# Patient Record
Sex: MALE | Race: WHITE | NOT HISPANIC OR LATINO | Employment: FULL TIME | URBAN - METROPOLITAN AREA
[De-identification: names, ages, dates, MRNs, and addresses within clinical notes are randomized per-mention and may not be internally consistent; named-entity substitution may affect disease eponyms.]

---

## 2017-01-18 ENCOUNTER — TRANSCRIBE ORDERS (OUTPATIENT)
Dept: SLEEP CENTER | Facility: CLINIC | Age: 47
End: 2017-01-18

## 2017-01-18 DIAGNOSIS — G47.33 OBSTRUCTIVE SLEEP APNEA (ADULT) (PEDIATRIC): Primary | ICD-10-CM

## 2017-02-17 ENCOUNTER — TRANSCRIBE ORDERS (OUTPATIENT)
Dept: SLEEP CENTER | Facility: CLINIC | Age: 47
End: 2017-02-17

## 2017-05-03 ENCOUNTER — HOSPITAL ENCOUNTER (OUTPATIENT)
Dept: SLEEP CENTER | Facility: CLINIC | Age: 47
Discharge: HOME | End: 2017-05-03
Payer: COMMERCIAL

## 2017-05-03 ENCOUNTER — TRANSCRIBE ORDERS (OUTPATIENT)
Dept: SLEEP CENTER | Facility: CLINIC | Age: 47
End: 2017-05-03

## 2017-05-03 DIAGNOSIS — G47.33 OBSTRUCTIVE SLEEP APNEA (ADULT) (PEDIATRIC): ICD-10-CM

## 2017-05-03 DIAGNOSIS — G47.33 OSA (OBSTRUCTIVE SLEEP APNEA): Primary | ICD-10-CM

## 2017-07-13 ENCOUNTER — LAB REQUISITION (OUTPATIENT)
Dept: LAB | Facility: HOSPITAL | Age: 47
End: 2017-07-13
Payer: COMMERCIAL

## 2017-07-13 DIAGNOSIS — E78.5 HYPERLIPIDEMIA: ICD-10-CM

## 2017-07-13 LAB
ALBUMIN SERPL BCP-MCNC: 4.2 G/DL (ref 3.5–5)
ALP SERPL-CCNC: 51 U/L (ref 46–116)
ALT SERPL W P-5'-P-CCNC: 40 U/L (ref 12–78)
ANION GAP SERPL CALCULATED.3IONS-SCNC: 6 MMOL/L (ref 4–13)
AST SERPL W P-5'-P-CCNC: 27 U/L (ref 5–45)
BASOPHILS # BLD AUTO: 0.03 THOUSANDS/ΜL (ref 0–0.1)
BASOPHILS NFR BLD AUTO: 1 % (ref 0–1)
BILIRUB SERPL-MCNC: 1.03 MG/DL (ref 0.2–1)
BUN SERPL-MCNC: 11 MG/DL (ref 5–25)
CALCIUM SERPL-MCNC: 9 MG/DL (ref 8.3–10.1)
CHLORIDE SERPL-SCNC: 105 MMOL/L (ref 100–108)
CHOLEST SERPL-MCNC: 169 MG/DL (ref 50–200)
CO2 SERPL-SCNC: 27 MMOL/L (ref 21–32)
CREAT SERPL-MCNC: 0.93 MG/DL (ref 0.6–1.3)
EOSINOPHIL # BLD AUTO: 0.09 THOUSAND/ΜL (ref 0–0.61)
EOSINOPHIL NFR BLD AUTO: 2 % (ref 0–6)
ERYTHROCYTE [DISTWIDTH] IN BLOOD BY AUTOMATED COUNT: 13.9 % (ref 11.6–15.1)
GFR SERPL CREATININE-BSD FRML MDRD: >60 ML/MIN/1.73SQ M
GLUCOSE P FAST SERPL-MCNC: 85 MG/DL (ref 65–99)
HCT VFR BLD AUTO: 42.9 % (ref 36.5–49.3)
HDLC SERPL-MCNC: 38 MG/DL (ref 40–60)
HGB BLD-MCNC: 15.1 G/DL (ref 12–17)
LDLC SERPL CALC-MCNC: 91 MG/DL (ref 0–100)
LYMPHOCYTES # BLD AUTO: 1.54 THOUSANDS/ΜL (ref 0.6–4.47)
LYMPHOCYTES NFR BLD AUTO: 30 % (ref 14–44)
MCH RBC QN AUTO: 30.9 PG (ref 26.8–34.3)
MCHC RBC AUTO-ENTMCNC: 35.2 G/DL (ref 31.4–37.4)
MCV RBC AUTO: 88 FL (ref 82–98)
MONOCYTES # BLD AUTO: 0.42 THOUSAND/ΜL (ref 0.17–1.22)
MONOCYTES NFR BLD AUTO: 8 % (ref 4–12)
NEUTROPHILS # BLD AUTO: 3.02 THOUSANDS/ΜL (ref 1.85–7.62)
NEUTS SEG NFR BLD AUTO: 59 % (ref 43–75)
NRBC BLD AUTO-RTO: 0 /100 WBCS
PLATELET # BLD AUTO: 170 THOUSANDS/UL (ref 149–390)
PMV BLD AUTO: 11.3 FL (ref 8.9–12.7)
POTASSIUM SERPL-SCNC: 4.5 MMOL/L (ref 3.5–5.3)
PROT SERPL-MCNC: 7.6 G/DL (ref 6.4–8.2)
RBC # BLD AUTO: 4.88 MILLION/UL (ref 3.88–5.62)
SODIUM SERPL-SCNC: 138 MMOL/L (ref 136–145)
TRIGL SERPL-MCNC: 200 MG/DL
WBC # BLD AUTO: 5.12 THOUSAND/UL (ref 4.31–10.16)

## 2017-07-13 PROCEDURE — 85025 COMPLETE CBC W/AUTO DIFF WBC: CPT | Performed by: FAMILY MEDICINE

## 2017-07-13 PROCEDURE — 80053 COMPREHEN METABOLIC PANEL: CPT | Performed by: FAMILY MEDICINE

## 2017-07-13 PROCEDURE — 80061 LIPID PANEL: CPT | Performed by: FAMILY MEDICINE

## 2017-07-24 ENCOUNTER — ALLSCRIPTS OFFICE VISIT (OUTPATIENT)
Dept: OTHER | Facility: OTHER | Age: 47
End: 2017-07-24

## 2017-09-20 ENCOUNTER — TRANSCRIBE ORDERS (OUTPATIENT)
Dept: SLEEP CENTER | Facility: CLINIC | Age: 47
End: 2017-09-20

## 2017-09-20 ENCOUNTER — HOSPITAL ENCOUNTER (OUTPATIENT)
Dept: SLEEP CENTER | Facility: CLINIC | Age: 47
Discharge: HOME | End: 2017-09-20
Payer: COMMERCIAL

## 2017-09-20 DIAGNOSIS — G47.33 OSA (OBSTRUCTIVE SLEEP APNEA): Primary | ICD-10-CM

## 2017-09-20 DIAGNOSIS — G47.33 OSA (OBSTRUCTIVE SLEEP APNEA): ICD-10-CM

## 2017-09-29 ENCOUNTER — ALLSCRIPTS OFFICE VISIT (OUTPATIENT)
Dept: OTHER | Facility: OTHER | Age: 47
End: 2017-09-29

## 2017-11-16 ENCOUNTER — ALLSCRIPTS OFFICE VISIT (OUTPATIENT)
Dept: OTHER | Facility: OTHER | Age: 47
End: 2017-11-16

## 2017-11-16 ENCOUNTER — GENERIC CONVERSION - ENCOUNTER (OUTPATIENT)
Dept: OTHER | Facility: OTHER | Age: 47
End: 2017-11-16

## 2017-11-16 ENCOUNTER — TRANSCRIBE ORDERS (OUTPATIENT)
Dept: ADMINISTRATIVE | Facility: HOSPITAL | Age: 47
End: 2017-11-16

## 2017-11-16 DIAGNOSIS — I10 ESSENTIAL HYPERTENSION, BENIGN: Primary | ICD-10-CM

## 2017-11-16 DIAGNOSIS — I10 ESSENTIAL (PRIMARY) HYPERTENSION: ICD-10-CM

## 2017-11-16 DIAGNOSIS — E78.5 HYPERLIPIDEMIA: ICD-10-CM

## 2017-11-17 NOTE — CONSULTS
Assessment    1  Benign essential hypertension (401 1) (I10)   2  Dyslipidemia (272 4) (E78 5)   3  Encounter for consultation (V65 9) (Z71 9)    Plan  Benign essential hypertension, Dyslipidemia    · ECHO COMPLETE WITH CONTRAST IF INDICATED; Status:Need Information - FinancialAuthorization; Requested for:2017;    Perform:[facility]; [id number]; Ordered;essential hypertension, Dyslipidemia; Ordered By:Bard Manuel Miranda;   · STRESS TEST ONLY, EXERCISE; Status:Hold For - Scheduling; Requestedfor:2017;    Perform:[facility]; Due:2018; Ordered; For:Benign essential hypertension, Dyslipidemia; Ordered By:Bard Manuel Miranda; Dyslipidemia    · (1) APOLIPOPROTEIN A1; Status:Active; Requested for:2017;    Perform:[facility]; XMH:[id number]; Ordered; For:Dyslipidemia; Ordered By:Bard Manuel Miranda;   · [facility]) Apolipoprotein B; Status:Active; Requested for:2017;    Perform:[facility]; Due:2018; Ordered; For:Dyslipidemia; Ordered By:Nikunj Miranda;   · (Q) VLDL CHOLESTEROL; Status:Active; Requested for:2017;    Perform:[facility]; Due:2018; Ordered;Ordered Renata Weinstein;   · Follow-up visit in 2 months Evaluation and Treatment  Follow-up  Status: Hold For -Scheduling  Requested for: 64VYN3749   Ordered; For: Dyslipidemia; Ordered By: Bassem Luciano Performed:  Due: 24WVY9525  Encounter for consultation    · EKG/ECG- POC; Status:Complete;   Done: 07ETM7824   Perform: In Office; Due:2018; Last Updated By:Romero-Rosa, Celine Hamman; 2017 2:39:51 PM;Ordered;for consultation; Bard Manuel Patel; Discussion/Summary    1  Strong family hx of CAD with sister 3V dz at 45, father CABG at 61- she had high Lpa with    His is on simvastatin and zetiatest, echo, check LVDL, apolipoprtoein a and bwill only serve to be more aggressive with lipid management such as switching to rosuvastatinHTN- a little high here today- just started on losartan, stress test will check BP responseDyslipidemia- HDL low at 38, HDL 90- on simvastatin 40 mg and zetia 10 mgwt loss  check small particles to guide intensity of therapyOSA- on CPAPObesity- discussed exercise, wt loss  Chief Complaint  New patient visit  History of Present Illness  Cardiology HPI Free Text Note Form  Luke: 54 yo male for CV eval for CAD given his sister at age of 45 had SOB and found to have triple vessel dz- will be getting stents  He is treated for HTN, hypercholesterolemia and SAUL due to SAUL  His BMI is 38  His LDL is 90, her  Lpa 1515  His father had CABG at age 61  Neither was smoker  He denies chest pressure  No dyspnea  He does use his CPAP  Chronic cough  He exercises by walking 3x a week with no symptoms  Review of Systems     Cardiac: No complaints of chest pain, no palpitations, no fainiting  Skin: No complaints of nonhealing sores or skin rash  Genitourinary: No complaints of recurrent urinary tract infections, frequent urination at night, difficult urination, blood in urine, kidney stones, loss of bladder control, no kidney or prostate problems, no erectile dysfunction  Psychological: No complaints of feeling depressed, anxiety, panic attacks, or difficulty concentrating  General: No complaints of trouble sleeping, lack of energy, fatigue, appetite changes, weight changes, fever, frequent infections, or night sweats  Respiratory: No complaints of shortness of breath, cough with sputum, or wheezing  HEENT: No complaints of serious problems, hearing problems, nose problems, throat problems, or snoring  Gastrointestinal: No complaints of liver problems, nausea, vomiting, heartburn, constipation, bloody stools, diarrhea, problems swallowing, adbominal pain, or rectal bleeding  Hematologic: No complaints of bleeding disorders, anemia, blood clots, or excessive brusing    Neurological: No complaints of numbness, tingling, dizziness, weakness, seizures, headaches, syncope or fainting, AM fatigue, daytime sleepiness, no witnessed apnea episodes  Musculoskeletal: No complaints of arthritis, back pain, or painfull swelling  Active Problems  1  Benign essential hypertension (401 1) (I10)   2  Dyslipidemia (272 4) (E78 5)   3  Encounter for consultation (V65 9) (Z71 9)   4  Morbid or severe obesity due to excess calories (278 01) (E66 01)   5  SAUL (obstructive sleep apnea) (327 23) (G47 33)    Past Medical History   · Acute bronchitis (466 0) (J20 9)   · History of Acute bronchitis, unspecified organism (466 0) (J20 9)   · History of Acute serous otitis media, unspecified laterality   · History of Acute sinusitis, recurrence not specified, unspecified location (461 9) (J01 90)   · History of Acute upper respiratory infection (465 9) (J06 9)   · History of Exposure to influenza (V01 79) (Z20 828)   · History of Fatigue, unspecified type (780 79) (R53 83)   · History of acute sinusitis (V12 69) (Z87 09)   · History of gastroesophageal reflux (GERD) (V12 79) (Z87 19)   · History of hyperlipidemia (V12 29) (Z86 39)   · History of sleep apnea (V13 89) (Z86 69)   · History of snoring (V15 89) (W38 061)   · History of Need for influenza vaccination (V04 81) (Z23)   · History of Otitis externa, unspecified laterality   · History of Witnessed apneic spells (786 03) (R06 81)    The active problems and past medical history were reviewed and updated today  Surgical History   · History of Mid Sternotomy    The surgical history was reviewed and updated today  Family History  Mother    · Family history of Hypertension (V17 49)  Father    · Family history of Coronary Artery Disease (V17 49)  Family History Reviewed: The family history was reviewed and updated today  Social History     · Being A Social Drinker   · Denied: History of Drug Use   · Denied: History of Never A Smoker   · Never A Smoker  The social history was reviewed and updated today  Current Meds   1  Ezetimibe 10 MG Oral Tablet;  Take 1 tablet daily; Therapy: 81WLY2014 to (Last Rx:30Tdr9256)  Requested for: 02Oct2017 Ordered   2  Losartan Potassium 50 MG Oral Tablet; take 1 tablet by mouth once daily; Therapy: 80ADW0631 to (Evaluate:02Auc6499)  Requested for: 23HKR5633; Last Rx:04Phx0061 Ordered   3  Simvastatin 40 MG Oral Tablet; Take 1 tablet daily; Therapy: 68QMY5662 to (Last Rx:17Sba6462)  Requested for: 31Oct2017 Ordered    The medication list was reviewed and updated today  Allergies  1  ACE Inhibitors    Vitals  Signs     Heart Rate: 68, Apical  Pulse Quality: Normal, Apical  Systolic: 942, RUE, Sitting  Diastolic: 78, RUE, Sitting  Height: 6 ft 1 in  Weight: 294 lb   BMI Calculated: 38 79  BSA Calculated: 2 53    Physical Exam   Constitutional  General appearance: No acute distress, well appearing and well nourished  Eyes  Conjunctiva and Sclera examination: Conjunctiva pink, sclera anicteric  Ears, Nose, Mouth, and Throat - Oropharynx: Clear, nares are clear, mucous membranes are moist   Neck  Neck and thyroid: Normal, supple, trachea midline, no thyromegaly  Pulmonary  Respiratory effort: No increased work of breathing or signs of respiratory distress  Auscultation of lungs: Clear to auscultation, no rales, no rhonchi, no wheezing, good air movement  Cardiovascular  Auscultation of heart: Normal rate and rhythm, normal S1 and S2, no murmurs  Carotid pulses: Normal, 2+ bilaterally  Peripheral vascular exam: Normal pulses throughout, no tenderness, erythema or swelling  Pedal pulses: Normal, 2+ bilaterally  Examination of extremities for edema and/or varicosities: Normal    Abdomen  Abdomen: Non-tender and no distention  Liver and spleen: No hepatomegaly or splenomegaly  Musculoskeletal Gait and station: Normal gait  -- Digits and nails: Normal without clubbing or cyanosis  -- Inspection/palpation of joints, bones, and muscles: Normal, ROM normal    Skin - Skin and subcutaneous tissue: Normal without rashes or lesions   Skin is warm and well perfused, normal turgor  Neurologic - Cranial nerves: II - XII intact  -- Speech: Normal    Psychiatric - Orientation to person, place, and time: Normal -- Mood and affect: Normal       Results/Data   Lab Review: HDL 38,   93HgB 15   SR      Future Appointments    Date/Time Provider Specialty Site   01/03/2018 11:15 AM Rosetta Horan DO [facility] [address]     End of Encounter Meds    1  Losartan Potassium 50 MG Oral Tablet; take 1 tablet by mouth once daily; Therapy: 87KJI7980 to (Evaluate:06Snc0540)  Requested for: 52WQR8353; Last Rx:36Tph5604 Ordered    2  Ezetimibe 10 MG Oral Tablet; Take 1 tablet daily; Therapy: 05UHS2195 to (Last Rx:02Oct2017)  Requested for: 02Oct2017 Ordered   3  Simvastatin 40 MG Oral Tablet; Take 1 tablet daily;  Therapy: 45XJR0363 to (Last Rx:31Oct2017)  Requested for: 89JSY0053 Ordered    Signatures   Electronically signed by : Rebecca Bonner DO; Nov 16 2017  3:16PM EST                       (Author)

## 2017-11-29 ENCOUNTER — LAB CONVERSION - ENCOUNTER (OUTPATIENT)
Dept: OTHER | Facility: OTHER | Age: 47
End: 2017-11-29

## 2017-11-29 LAB
APOLIPOPROTEIN A-1 (HISTORICAL): 128 MG/DL (ref 94–176)
APOLIPOPROTEIN B (HISTORICAL): 101 MG/DL (ref 52–109)
TRIGL SERPL-MCNC: 259 MG/DL
VLDL CHOLESTEROL (HISTORICAL): 52 MG/DL (CALC)

## 2017-12-06 ENCOUNTER — HOSPITAL ENCOUNTER (OUTPATIENT)
Dept: NON INVASIVE DIAGNOSTICS | Facility: HOSPITAL | Age: 47
Discharge: HOME | End: 2017-12-06
Attending: INTERNAL MEDICINE
Payer: COMMERCIAL

## 2017-12-06 ENCOUNTER — GENERIC CONVERSION - ENCOUNTER (OUTPATIENT)
Dept: CARDIOLOGY CLINIC | Facility: CLINIC | Age: 47
End: 2017-12-06

## 2017-12-06 DIAGNOSIS — E78.5 HYPERLIPIDEMIA: ICD-10-CM

## 2017-12-06 DIAGNOSIS — I10 ESSENTIAL HYPERTENSION, BENIGN: ICD-10-CM

## 2017-12-06 DIAGNOSIS — I10 ESSENTIAL (PRIMARY) HYPERTENSION: ICD-10-CM

## 2017-12-06 PROCEDURE — 93017 CV STRESS TEST TRACING ONLY: CPT

## 2017-12-06 PROCEDURE — 93306 TTE W/DOPPLER COMPLETE: CPT

## 2017-12-07 LAB
CHEST PAIN STATEMENT: NORMAL
MAX DIASTOLIC BP: 62 MMHG
MAX HEART RATE: 162 BPM
MAX PREDICTED HEART RATE: 173 BPM
MAX. SYSTOLIC BP: 192 MMHG
PROTOCOL NAME: NORMAL
TARGET HR FORMULA: NORMAL
TEST INDICATION: NORMAL
TIME IN EXERCISE PHASE: NORMAL

## 2017-12-20 ENCOUNTER — ALLSCRIPTS OFFICE VISIT (OUTPATIENT)
Dept: OTHER | Facility: OTHER | Age: 47
End: 2017-12-20

## 2017-12-29 NOTE — PROGRESS NOTES
Assessment   1  Acute bacterial sinusitis (461 9) (J01 90,B96 89)   2  Benign essential hypertension (401 1) (I10)    Plan   Acute bacterial sinusitis    · Breo Ellipta 100-25 MCG/INH Inhalation Aerosol Powder Breath Activated; INHALE    1 PUFFS Daily rinse mouth after using   · LevoFLOXacin 500 MG Oral Tablet (Levaquin); Take 1 tablet daily    Discussion/Summary      27-year-old male presenting today for acute upper respiratory symptoms consistent with acute sinusitis  Patient to complete a 10 day course of Levaquin once daily  He will continue Claritin  I would like him to start Mucinex DM to help with cough and mucus  I also provided him with a sample of Breo to use for his chest complaints and cough  First dose used in office to ensure proper use of the device  He will use 1 puff daily and rinse and out mouth after every use  He does have hypertension and his pressure is decent today at 130/90 but I advised against any other over-the-counter cold medications that may raise his pressure  Rest and fluids  Patient advised to call the office should symptoms persist or worsen despite treatment  Possible side effects of new medications were reviewed with the patient/guardian today  The treatment plan was reviewed with the patient/guardian  The patient/guardian understands and agrees with the treatment plan      Chief Complaint   1  Cold Symptoms  pt complains of cough/congestion/sore throat/fever that started on Saturday      History of Present Illness   HPI: 52y/o male here today for sinus sxs past week  reports nasal congestion, sinus pressure, PND and sore throat, chest tightness, congestion especially at night while lying down with some SOB  denies ear pain  temp 100 last night  tried claritin and advil  Review of Systems        Constitutional: as noted in HPI       ENT: as noted in HPI        Cardiovascular: no complaints of slow or fast heart rate, no chest pain, no palpitations, no leg claudication or lower extremity edema  Respiratory: as noted in HPI  Gastrointestinal: no complaints of abdominal pain, no constipation, no nausea or vomiting, no diarrhea or bloody stools  Active Problems   1  Benign essential hypertension (401 1) (I10)   2  Dyslipidemia (272 4) (E78 5)   3  Morbid or severe obesity due to excess calories (278 01) (E66 01)   4  SAUL (obstructive sleep apnea) (327 23) (G47 33)    Past Medical History   1  Acute bronchitis (466 0) (J20 9)   2  History of Acute bronchitis, unspecified organism (466 0) (J20 9)   3  History of Acute serous otitis media, unspecified laterality   4  History of Acute sinusitis, recurrence not specified, unspecified location (461 9) (J01 90)   5  History of Acute upper respiratory infection (465 9) (J06 9)   6  History of Exposure to influenza (V01 79) (Z20 828)   7  History of Fatigue, unspecified type (780 79) (R53 83)   8  History of acute sinusitis (V12 69) (Z87 09)   9  History of gastroesophageal reflux (GERD) (V12 79) (Z87 19)   10  History of hyperlipidemia (V12 29) (Z86 39)   11  History of sleep apnea (V13 89) (Z86 69)   12  History of snoring (V15 89) (Z87 898)   13  History of Need for influenza vaccination (V04 81) (Z23)   14  History of Otitis externa, unspecified laterality   15  History of Witnessed apneic spells (786 03) (R06 81)    Family History   Mother    1  Family history of Hypertension (V17 49)  Father    2  Family history of Coronary Artery Disease (V17 49)    Social History    · Being A Social Drinker   · Denied: History of Drug Use   · Never A Smoker   · Denied: History of Never A Smoker  The social history was reviewed and updated today  Surgical History   1  History of Mid Sternotomy    Current Meds    1  Ezetimibe 10 MG Oral Tablet; Take 1 tablet daily; Therapy: 93WCZ0753 to (Last Rx:02Oct2017)  Requested for: 02Oct2017 Ordered   2   Losartan Potassium 50 MG Oral Tablet; TAKE ONE TABLET BY MOUTH ONCE DAILY AS DIRECTED; Therapy: 65FMV3168 to (Evaluate:99Lan0343)  Requested for: 71VGA3297; Last     Rx:44Xqp2022 Ordered   3  Rosuvastatin Calcium 20 MG Oral Tablet; Take 1 tablet daily; Therapy: 24RPK3550 to (Eli Mix)  Requested for: 25GCD8546; Last     Rx:94Mow4994 Ordered     The medication list was reviewed and updated today  Allergies   1  ACE Inhibitors    Vitals    Recorded: 20Dec2017 01:28PM   Temperature 97 6 F, Tympanic   Heart Rate 90   Pulse Quality Normal   Respiration Quality Normal   Respiration 17   Systolic 128, LUE, Sitting   Diastolic 90, LUE, Sitting   Height 6 ft 1 in   Weight 295 lb 1 oz   BMI Calculated 38 93   BSA Calculated 2 54   O2 Saturation 95   Pain Scale 0     Physical Exam        Constitutional      General appearance: Abnormal   acutely ill,-- comfortable,-- overweight,-- appears tired-- and-- appearance reflects stated age  Eyes      Conjunctiva and lids: No swelling, erythema, or discharge  Ears, Nose, Mouth, and Throat      External inspection of ears and nose: Normal        Otoscopic examination: Tympanic membrance translucent with normal light reflex  Canals patent without erythema  Nasal mucosa, septum, and turbinates: Abnormal   There was a mucoid discharge from both nares  The bilateral nasal mucosa was boggy,-- edematous-- and-- red  B/L swelling and erythema  Oropharynx: Abnormal  -- PND  Pulmonary      Respiratory effort: Abnormal  -- hacking cough  Auscultation of lungs: Abnormal  -- slight coarse BS throughout  no wheeze or consolidation  Cardiovascular      Auscultation of heart: Normal rate and rhythm, normal S1 and S2, without murmurs  Lymphatic      Palpation of lymph nodes in neck: No lymphadenopathy  Psychiatric      Orientation to person, place and time: Normal        Mood and affect: Normal        Additional Exam:  vitals reviewed           Future Appointments      Date/Time Provider Specialty Site 01/03/2018 11:15 AM Montana Hull DO [facility] [address]    Electronically signed by : Felicita Reilly, [facility]; Dec 20 2017  1:58PM EST                       (Author)     Electronically signed by : Shanta Santillan DO; Dec 29 2017  4:27AM EST                       (Co-author)

## 2018-01-09 NOTE — RESULT NOTES
Verified Results  (1) CBC/PLT/DIFF 27Oct2016 11:13AM Rupali ALCARAZ Order Number: [id number]     Test Name Result Flag Reference   WBC COUNT 6 26 Thousand/uL  4 31-10 16   RBC COUNT 5 04 Million/uL  3 88-5 62   HEMOGLOBIN 15 3 g/dL  12 0-17 0   HEMATOCRIT 43 2 %  36 5-49 3   MCV 86 fL  82-98   MCH 30 4 pg  26 8-34 3   MCHC 35 4 g/dL  31 4-37 4   RDW 13 3 %  11 6-15 1   MPV 10 7 fL  8 9-12 7   PLATELET COUNT 829 Thousands/uL  149-390   nRBC AUTOMATED 0 /100 WBCs     NEUTROPHILS RELATIVE PERCENT 56 %  43-75   LYMPHOCYTES RELATIVE PERCENT 30 %  14-44   MONOCYTES RELATIVE PERCENT 9 %  4-12   EOSINOPHILS RELATIVE PERCENT 4 %  0-6   BASOPHILS RELATIVE PERCENT 1 %  0-1   NEUTROPHILS ABSOLUTE COUNT 3 50 Thousands/?L  1 85-7 62   LYMPHOCYTES ABSOLUTE COUNT 1 89 Thousands/?L  0 60-4 47   MONOCYTES ABSOLUTE COUNT 0 57 Thousand/?L  0 17-1 22   EOSINOPHILS ABSOLUTE COUNT 0 22 Thousand/?L  0 00-0 61   BASOPHILS ABSOLUTE COUNT 0 06 Thousands/?L  0 00-0 10   - Patient Instructions: This bloodwork is non-fasting  Please drink two glasses of water morning of bloodwork  (1) COMPREHENSIVE METABOLIC PANEL 98QUP3616 67:04MZ Silvino Wallace Order Number: [id number]     Test Name Result Flag Reference   GLUCOSE,RANDM 92 mg/dL     If the patient is fasting, the ADA then defines impaired fasting glucose as > 100 mg/dL and diabetes as > or equal to 123 mg/dL     SODIUM 138 mmol/L  136-145   POTASSIUM 4 2 mmol/L  3 5-5 3   CHLORIDE 104 mmol/L  100-108   CARBON DIOXIDE 25 mmol/L  21-32   ANION GAP (CALC) 9 mmol/L  4-13   BLOOD UREA NITROGEN 10 mg/dL  5-25   CREATININE 0 93 mg/dL  0 60-1 30   Standardized to IDMS reference method   CALCIUM 8 4 mg/dL  8 3-10 1   BILI, TOTAL 0 70 mg/dL  0 20-1 00   ALK PHOSPHATAS 52 U/L     ALT (SGPT) 38 U/L  12-78   AST(SGOT) 18 U/L  5-45   ALBUMIN 4 1 g/dL  3 5-5 0   TOTAL PROTEIN 7 4 g/dL  6 4-8 2   eGFR Non-African American      >60 0 ml/min/1 73sq josé miguel   [employer] Education Program recommendations are as follows:  GFR calculation is accurate only with a steady state creatinine  Chronic Kidney disease less than 60 ml/min/1 73 sq  meters  Kidney failure less than 15 ml/min/1 73 sq  meters  (1) LIPID PANEL FASTING W DIRECT LDL REFLEX 27Oct2016 11:13AM Elena Mata    Order Number: [id number]     Test Name Result Flag Reference   CHOLESTEROL 159 mg/dL     LDL CHOLESTEROL CALCULATED 86 mg/dL  0-100   - Patient Instructions: This is a fasting blood test  Water, black tea or black coffee only after 9:00pm the night before test   Drink 2 glasses of water the morning of test       Triglyceride:         Normal              <150 mg/dl       Borderline High    150-199 mg/dl       High               200-499 mg/dl       Very High          >499 mg/dl  Cholesterol:         Desirable        <200 mg/dl      Borderline High  200-239 mg/dl      High             >239 mg/dl  HDL Cholesterol:        High    >59 mg/dL      Low     <41 mg/dL  LDL Cholesterol:        Optimal          <100 mg/dl        Near Optimal     100-129 mg/dl        Above Optimal          Borderline High   130-159 mg/dl          High              160-189 mg/dl          Very High        >189 mg/dl  LDL CALCULATED:    This screening LDL is a calculated result  It does not have the accuracy of the Direct Measured LDL in the monitoring of patients with hyperlipidemia and/or statin therapy  Direct Measure LDL (RNO010) must be ordered separately in these patients  TRIGLYCERIDES 199 mg/dL H <=150   Specimen collection should occur prior to N-Acetylcysteine or Metamizole administration due to the potential for falsely depressed results  HDL,DIRECT 33 mg/dL L 40-60   Specimen collection should occur prior to Metamizole administration due to the potential for falsely depressed results       (1) TSH WITH FT4 REFLEX 27Oct2016 11:13AM Nava Harvey Order Number: [id number]     Test Name Result Flag Reference   TSH 1 300 uIU/mL  0 358-3 740   Patients undergoing fluorescein dye angiography may retain small amounts of fluorescein in the body for 48-72 hours post procedure  Samples containing fluorescein can produce falsely depressed TSH values  If the patient had this procedure,a specimen should be resubmitted post fluorescein clearance

## 2018-01-11 NOTE — MISCELLANEOUS
Message  vldl is high at 52, will switch simvastatin to rosuvastatin at 20 mg daily      Plan  Dyslipidemia    · Simvastatin 40 MG Oral Tablet   · Rosuvastatin Calcium 20 MG Oral Tablet;  Take 1 tablet by mouth  daily    Signatures   Electronically signed by : Trude Hashimoto, DO; Nov 28 2017  2:59PM EST                       (Author)

## 2018-01-12 VITALS
DIASTOLIC BLOOD PRESSURE: 78 MMHG | HEIGHT: 73 IN | HEART RATE: 68 BPM | SYSTOLIC BLOOD PRESSURE: 142 MMHG | BODY MASS INDEX: 38.97 KG/M2 | WEIGHT: 294 LBS

## 2018-01-13 VITALS
BODY MASS INDEX: 38.38 KG/M2 | RESPIRATION RATE: 18 BRPM | SYSTOLIC BLOOD PRESSURE: 164 MMHG | OXYGEN SATURATION: 95 % | HEIGHT: 74 IN | HEART RATE: 83 BPM | WEIGHT: 299.06 LBS | DIASTOLIC BLOOD PRESSURE: 102 MMHG | TEMPERATURE: 96.5 F

## 2018-01-13 VITALS
WEIGHT: 293.13 LBS | SYSTOLIC BLOOD PRESSURE: 148 MMHG | TEMPERATURE: 96.9 F | HEIGHT: 74 IN | OXYGEN SATURATION: 96 % | DIASTOLIC BLOOD PRESSURE: 94 MMHG | HEART RATE: 83 BPM | BODY MASS INDEX: 37.62 KG/M2

## 2018-01-14 NOTE — PROGRESS NOTES
Assessment    1  Acute bronchitis, unspecified organism (466 0) (J20 9)    Plan  Acute bronchitis, unspecified organism    · Levofloxacin 500 MG Oral Tablet; TAKE 1 TABLET DAILY WITH FOOD  Dyslipidemia    · Simvastatin 40 MG Oral Tablet; Take 1 tablet daily   · Simvastatin 40 MG Oral Tablet; Take 1 tablet daily   · Zetia 10 MG Oral Tablet; Take 1 tablet once daily    Discussion/Summary    Discussed OTC cold meds  Fever Care, ER instructions given  F/U 5-7 days if not resolved  Pt  would like to be restarted on his cholesterol meds  Has been off of them for a few months as he ran out and never got refilled  I will restart his Simvastatin and Zetia and rec  he take them daily and compliantly for a few months and then will obtain FBW to assess for control  Possible side effects of new medications were reviewed with the patient/guardian today  The treatment plan was reviewed with the patient/guardian  The patient/guardian understands and agrees with the treatment plan      Chief Complaint    1  Cold Symptoms  Pt presents with cough for weeks  He is wheezing and crackling  History of Present Illness  HPI: Pt  presents with 2 5 week history of ST, cough, PND, chest congestion with yellow mucus  Denies nasal congestion, N/V/D  He has taken Sudafed a few times  Denies hx of asthma/allergies  Does not smoke  Has not had a flu shot  Review of Systems    Constitutional: no fever or chills, feels well, no tiredness, no recent weight loss or weight gain  ENT: as noted in HPI  Cardiovascular: no complaints of slow or fast heart rate, no chest pain, no palpitations, no leg claudication or lower extremity edema  Respiratory: as noted in HPI  Gastrointestinal: no complaints of abdominal pain, no constipation, no nausea or vomiting, no diarrhea or bloody stools  Genitourinary: no complaints of dysuria or incontinence, no hesitancy, no nocturia, no genital lesion, no inadequacy of penile erection  Active Problems    1  Acute serous otitis media, unspecified laterality   2  Acute upper respiratory infection (465 9) (J06 9)   3  Dyslipidemia (272 4) (E78 5)   4  Otitis externa, unspecified laterality    Past Medical History    1  History of acute sinusitis (V12 69) (Z87 09)   2  History of gastroesophageal reflux (GERD) (V12 79) (Z87 19)   3  History of hyperlipidemia (V12 29) (Z86 39)   4  History of sleep apnea (V13 89) (Z87 09)    Family History    1  Family history of Hypertension (V17 49)    2  Family history of Coronary Artery Disease (V17 49)    Social History    · Being A Social Drinker   · Denied: History of Drug Use   · Never A Smoker   · Denied: History of Never A Smoker  The social history was reviewed and is unchanged  Surgical History    1  History of Mid Sternotomy    Current Meds   1  Simvastatin 40 MG Oral Tablet; Take 1 tablet daily; Therapy: 25RFO8415 to (Evaluate:18Pxk8397)  Requested for: 53SZX0516; Last   Rx:07Mar2014 Ordered   2  Zetia 10 MG Oral Tablet; Take 1 tablet once daily; Therapy: 05PEZ4891 to (Last Rx:08Mar2014)  Requested for: 37TJA6785 Ordered    The medication list was reviewed and updated today  Allergies    1  No Known Drug Allergies    Vitals   Recorded: 58BXQ8735 04:36PM   Temperature 97 3 F, Tympanic   Heart Rate 76   Systolic 533   Diastolic 90   Height 6 ft 1 5 in   Weight 284 lb    BMI Calculated 36 96   BSA Calculated 2 5   O2 Saturation 97     Physical Exam    Constitutional   General appearance: No acute distress, well appearing and well nourished  Ears, Nose, Mouth, and Throat   External inspection of ears and nose: Normal     Otoscopic examination: Tympanic membrance translucent with normal light reflex  Canals patent without erythema  Nasal mucosa, septum, and turbinates: Normal without edema or erythema  Oropharynx: Abnormal   PND and erythema; no exudates     Pulmonary   Respiratory effort: No increased work of breathing or signs of respiratory distress  Auscultation of lungs: Abnormal   B/L diffuse coarse decreased breath sounds; no wheezes  Cardiovascular   Auscultation of heart: Normal rate and rhythm, normal S1 and S2, without murmurs  Lymphatic   Palpation of lymph nodes in neck: No lymphadenopathy  Psychiatric   Orientation to person, place and time: Normal     Mood and affect: Normal          Signatures   Electronically signed by :  Melita Trujillo [facility]; Feb 2 2016 11:08AM EST                       (Author)    Electronically signed by : Codie Bernal DO; Feb 4 2016  6:20AM EST                       (Co-author)

## 2018-01-15 ENCOUNTER — GENERIC CONVERSION - ENCOUNTER (OUTPATIENT)
Dept: OTHER | Facility: OTHER | Age: 48
End: 2018-01-15

## 2018-01-23 VITALS
RESPIRATION RATE: 17 BRPM | TEMPERATURE: 97.6 F | WEIGHT: 295.06 LBS | OXYGEN SATURATION: 95 % | HEART RATE: 90 BPM | HEIGHT: 73 IN | DIASTOLIC BLOOD PRESSURE: 90 MMHG | BODY MASS INDEX: 39.11 KG/M2 | SYSTOLIC BLOOD PRESSURE: 130 MMHG

## 2018-01-24 VITALS
HEART RATE: 77 BPM | SYSTOLIC BLOOD PRESSURE: 130 MMHG | BODY MASS INDEX: 39.58 KG/M2 | DIASTOLIC BLOOD PRESSURE: 88 MMHG | RESPIRATION RATE: 18 BRPM | HEIGHT: 72 IN | WEIGHT: 292.19 LBS | TEMPERATURE: 97.5 F | OXYGEN SATURATION: 98 %

## 2018-03-31 DIAGNOSIS — E78.2 MIXED HYPERLIPIDEMIA: Primary | ICD-10-CM

## 2018-04-02 RX ORDER — EZETIMIBE 10 MG/1
TABLET ORAL
Qty: 90 TABLET | Refills: 0 | Status: SHIPPED | OUTPATIENT
Start: 2018-04-02 | End: 2018-07-07 | Stop reason: SDUPTHER

## 2018-04-27 RX ORDER — ROSUVASTATIN CALCIUM 20 MG/1
20 TABLET, COATED ORAL
COMMUNITY
Start: 2018-03-24 | End: 2018-12-02 | Stop reason: SDUPTHER

## 2018-04-27 RX ORDER — LOSARTAN POTASSIUM 100 MG/1
100 TABLET ORAL DAILY
COMMUNITY
Start: 2018-03-24 | End: 2018-06-21 | Stop reason: SDUPTHER

## 2018-05-10 PROBLEM — G47.33 OSA (OBSTRUCTIVE SLEEP APNEA): Status: ACTIVE | Noted: 2017-07-24

## 2018-05-10 PROBLEM — I10 BENIGN ESSENTIAL HYPERTENSION: Status: ACTIVE | Noted: 2017-07-24

## 2018-05-11 ENCOUNTER — TRANSCRIBE ORDERS (OUTPATIENT)
Dept: FAMILY MEDICINE CLINIC | Facility: CLINIC | Age: 48
End: 2018-05-11

## 2018-05-11 DIAGNOSIS — E66.01 EXTREME OBESITY WITH RESPIRATORY DISORDER (HCC): ICD-10-CM

## 2018-05-11 DIAGNOSIS — J98.9 EXTREME OBESITY WITH RESPIRATORY DISORDER (HCC): ICD-10-CM

## 2018-05-11 DIAGNOSIS — E78.5 DYSLIPIDEMIA: Primary | ICD-10-CM

## 2018-05-11 DIAGNOSIS — I10 ESSENTIAL HYPERTENSION, BENIGN: ICD-10-CM

## 2018-05-12 LAB
ALBUMIN SERPL-MCNC: 4.8 G/DL (ref 3.6–5.1)
ALBUMIN/GLOB SERPL: 1.7 (CALC) (ref 1–2.5)
ALP SERPL-CCNC: 41 U/L (ref 40–115)
ALT SERPL-CCNC: 33 U/L (ref 9–46)
AST SERPL-CCNC: 21 U/L (ref 10–40)
BASOPHILS # BLD AUTO: 50 CELLS/UL (ref 0–200)
BASOPHILS NFR BLD AUTO: 0.9 %
BILIRUB SERPL-MCNC: 1.5 MG/DL (ref 0.2–1.2)
BUN SERPL-MCNC: 12 MG/DL (ref 7–25)
BUN/CREAT SERPL: ABNORMAL (CALC) (ref 6–22)
CALCIUM SERPL-MCNC: 9.7 MG/DL (ref 8.6–10.3)
CHLORIDE SERPL-SCNC: 104 MMOL/L (ref 98–110)
CHOLEST SERPL-MCNC: 122 MG/DL
CHOLEST/HDLC SERPL: 3.4 (CALC)
CO2 SERPL-SCNC: 26 MMOL/L (ref 20–31)
CREAT SERPL-MCNC: 0.94 MG/DL (ref 0.6–1.35)
EOSINOPHIL # BLD AUTO: 132 CELLS/UL (ref 15–500)
EOSINOPHIL NFR BLD AUTO: 2.4 %
ERYTHROCYTE [DISTWIDTH] IN BLOOD BY AUTOMATED COUNT: 13.1 % (ref 11–15)
GLOBULIN SER CALC-MCNC: 2.9 G/DL (CALC) (ref 1.9–3.7)
GLUCOSE SERPL-MCNC: 92 MG/DL (ref 65–99)
HCT VFR BLD AUTO: 45.2 % (ref 38.5–50)
HDLC SERPL-MCNC: 36 MG/DL
HGB BLD-MCNC: 15.2 G/DL (ref 13.2–17.1)
LDLC SERPL CALC-MCNC: 60 MG/DL (CALC)
LYMPHOCYTES # BLD AUTO: 1634 CELLS/UL (ref 850–3900)
LYMPHOCYTES NFR BLD AUTO: 29.7 %
MCH RBC QN AUTO: 30.3 PG (ref 27–33)
MCHC RBC AUTO-ENTMCNC: 33.6 G/DL (ref 32–36)
MCV RBC AUTO: 90.2 FL (ref 80–100)
MONOCYTES # BLD AUTO: 490 CELLS/UL (ref 200–950)
MONOCYTES NFR BLD AUTO: 8.9 %
NEUTROPHILS # BLD AUTO: 3196 CELLS/UL (ref 1500–7800)
NEUTROPHILS NFR BLD AUTO: 58.1 %
NONHDLC SERPL-MCNC: 86 MG/DL (CALC)
PLATELET # BLD AUTO: 176 THOUSAND/UL (ref 140–400)
PMV BLD REES-ECKER: 10.8 FL (ref 7.5–12.5)
POTASSIUM SERPL-SCNC: 4.4 MMOL/L (ref 3.5–5.3)
PROT SERPL-MCNC: 7.7 G/DL (ref 6.1–8.1)
RBC # BLD AUTO: 5.01 MILLION/UL (ref 4.2–5.8)
SL AMB EGFR AFRICAN AMERICAN: 111 ML/MIN/1.73M2
SL AMB EGFR NON AFRICAN AMERICAN: 96 ML/MIN/1.73M2
SODIUM SERPL-SCNC: 141 MMOL/L (ref 135–146)
TRIGL SERPL-MCNC: 183 MG/DL
TSH SERPL-ACNC: 1.33 MIU/L (ref 0.4–4.5)
WBC # BLD AUTO: 5.5 THOUSAND/UL (ref 3.8–10.8)

## 2018-05-17 ENCOUNTER — OFFICE VISIT (OUTPATIENT)
Dept: FAMILY MEDICINE CLINIC | Facility: CLINIC | Age: 48
End: 2018-05-17
Payer: COMMERCIAL

## 2018-05-17 VITALS
RESPIRATION RATE: 18 BRPM | OXYGEN SATURATION: 96 % | TEMPERATURE: 97 F | HEART RATE: 71 BPM | DIASTOLIC BLOOD PRESSURE: 82 MMHG | WEIGHT: 301.5 LBS | BODY MASS INDEX: 40.84 KG/M2 | HEIGHT: 72 IN | SYSTOLIC BLOOD PRESSURE: 128 MMHG

## 2018-05-17 DIAGNOSIS — E78.5 DYSLIPIDEMIA: ICD-10-CM

## 2018-05-17 DIAGNOSIS — R17 ELEVATED BILIRUBIN: ICD-10-CM

## 2018-05-17 DIAGNOSIS — I10 BENIGN ESSENTIAL HYPERTENSION: Primary | ICD-10-CM

## 2018-05-17 DIAGNOSIS — G47.33 OSA (OBSTRUCTIVE SLEEP APNEA): ICD-10-CM

## 2018-05-17 PROCEDURE — 99214 OFFICE O/P EST MOD 30 MIN: CPT | Performed by: FAMILY MEDICINE

## 2018-05-17 PROCEDURE — 3079F DIAST BP 80-89 MM HG: CPT | Performed by: FAMILY MEDICINE

## 2018-05-17 PROCEDURE — 3074F SYST BP LT 130 MM HG: CPT | Performed by: FAMILY MEDICINE

## 2018-05-17 NOTE — ASSESSMENT & PLAN NOTE
Bilirubin 1 5  Patient does not have history of prior elevations  Possible  [place]  Will recheck again in 6 months  If still elevated, consider ultrasound of liver    Otherwise will just observe

## 2018-05-17 NOTE — PROGRESS NOTES
Assessment/Plan:    Benign essential hypertension   Control has improved since increasing losartan to 100 mg daily    Dyslipidemia   Cholesterol 122/60  Doing well on Crestor 20 and Zetia 10  SAUL (obstructive sleep apnea)   Doing well on nightly CPAP    Elevated bilirubin   Bilirubin 1 5  Patient does not have history of prior elevations  Possible  [place]  Will recheck again in 6 months  If still elevated, consider ultrasound of liver  Otherwise will just observe       Diagnoses and all orders for this visit:    Benign essential hypertension    Dyslipidemia  -     Comprehensive metabolic panel; Future  -     Lipid Panel with Direct LDL reflex; Future    SAUL (obstructive sleep apnea)    Elevated bilirubin      6 MOS, [facility]    Subjective:      Patient ID: Chel Gomez is a 52 y o  male  Patient presents for recheck of chronic medical problems today  Overall doing well without complaints  Doing well on blood pressure medication, losartan  Doing well on cholesterol medication, Crestor and Zetia  Doing well on nightly CPAP        The following portions of the patient's history were reviewed and updated as appropriate: allergies, current medications, past family history, past medical history, past social history, past surgical history and problem list     Review of Systems   Respiratory: Negative  Cardiovascular: Negative  Gastrointestinal: Negative  Genitourinary: Negative  Objective:      /82   Pulse 71   Temp (!) 97 °F (36 1 °C) (Tympanic)   Resp 18   Ht 6' (1 829 m)   Wt (!) 137 kg (301 lb 8 oz)   SpO2 96%   BMI 40 89 kg/m²          Physical Exam   Cardiovascular: Normal rate, regular rhythm, normal heart sounds and intact distal pulses  Carotids: no bruits  Ext: no edema   Pulmonary/Chest: Effort normal  No respiratory distress  He has no wheezes  He has no rales  Psychiatric: He has a normal mood and affect   His behavior is normal  Thought content normal

## 2018-06-21 DIAGNOSIS — I10 ESSENTIAL HYPERTENSION: Primary | ICD-10-CM

## 2018-06-21 RX ORDER — LOSARTAN POTASSIUM 100 MG/1
TABLET ORAL
Qty: 90 TABLET | Refills: 1 | Status: SHIPPED | OUTPATIENT
Start: 2018-06-21 | End: 2018-12-18 | Stop reason: SDUPTHER

## 2018-07-07 DIAGNOSIS — E78.2 MIXED HYPERLIPIDEMIA: ICD-10-CM

## 2018-07-07 RX ORDER — EZETIMIBE 10 MG/1
10 TABLET ORAL DAILY
Qty: 90 TABLET | Refills: 1 | Status: SHIPPED | OUTPATIENT
Start: 2018-07-07 | End: 2019-01-03 | Stop reason: SDUPTHER

## 2018-08-15 ENCOUNTER — OFFICE VISIT (OUTPATIENT)
Dept: SLEEP CENTER | Facility: CLINIC | Age: 48
End: 2018-08-15
Payer: COMMERCIAL

## 2018-08-15 VITALS
DIASTOLIC BLOOD PRESSURE: 80 MMHG | BODY MASS INDEX: 41.72 KG/M2 | HEIGHT: 72 IN | HEART RATE: 78 BPM | SYSTOLIC BLOOD PRESSURE: 152 MMHG | WEIGHT: 308 LBS

## 2018-08-15 DIAGNOSIS — G47.33 OSA (OBSTRUCTIVE SLEEP APNEA): Primary | ICD-10-CM

## 2018-08-15 DIAGNOSIS — I10 ESSENTIAL HYPERTENSION: ICD-10-CM

## 2018-08-15 DIAGNOSIS — E66.01 MORBID OBESITY (HCC): ICD-10-CM

## 2018-08-15 PROCEDURE — 99214 OFFICE O/P EST MOD 30 MIN: CPT | Performed by: INTERNAL MEDICINE

## 2018-08-15 NOTE — PROGRESS NOTES
Assessment/Plan:   Diagnoses and all orders for this visit:    SAUL (obstructive sleep apnea)    Morbid obesity ([address] )    Essential hypertension      Obstructive sleep apnea severe obstructive sleep apnea with an AHI of 62 3, currently on a CPAP of 11 cm of water to continue  Reviewed CPAP download compliance for the past 30 days with 100% compliance, no evidence of any mask leak, acceptable residual AHI at 0 7  Change of CPAP supplies discussed  Cleaning of the supplies also discussed understands and verbalizes  He does have some occasional nasal congestion with dryness, for which she is currently using the saline nasal spray 1 spray into each nostril to before putting on the mask  If he still has any postnasal drip asked him to use the fluticasone nasal spray 1 spray into each nostril after the saline spray and if still with nasal congestion and and postnasal drip he will see the ENT  Recommend weight loss      Return in about 1 year (around 8/15/2019)  All questions are answered to the patient's satisfaction and understanding  He verbalizes understanding  He is encouraged to call with any further questions or concerns  Portions of the record may have been created with voice recognition software  Occasional wrong word or "sound a like" substitutions may have occurred due to the inherent limitations of voice recognition software  Read the chart carefully and recognize, using context, where substitutions have occurred  Electronically Signed by Familia Mcconnell MD    ______________________________________________________________________    Chief Complaint:   Chief Complaint   Patient presents with    Follow-up       Patient ID: Man Parrish is a 52 y o  y o  male has a past medical history of GERD (gastroesophageal reflux disease); Hyperlipidemia; Sleep apnea; and Witnessed apneic spells      8/15/2018  Patient with history of severe obstructive sleep apnea with an AHI of 62 3 with a home sleep study done, had a CPAP titration study done titrated to a CPAP of 11 cm of water and has been consistently using it  He states he has decreased nocturnal awakenings and feels well rested when he wakes up in the morning  There is no history of any recent change of his medications, as well as no recent hospitalizations  He does state that he has gained a few lb since his last visit, and he is getting involved in a gym to lose weight he states  Next      Review of Systems      Genitourinary none   Cardiology none   Gastrointestinal none   Neurology none   Constitutional none   Integumentary none   Psychiatry none   Musculoskeletal none   Pulmonary none   ENT none   Endocrine none   Hematological none       Smoking history: He reports that he has never smoked  He has never used smokeless tobacco     The following portions of the patient's history were reviewed and updated as appropriate: allergies, current medications, past family history, past medical history, past social history, past surgical history and problem list     Immunization History   Administered Date(s) Administered    H1N1, All Formulations 11/21/2009    Influenza 10/17/2016, 10/01/2017    Influenza Quadrivalent, 6-35 Months IM 10/14/2015, 10/17/2016, 10/01/2017    Influenza TIV (IM) 10/27/2008, 10/11/2010, 10/13/2011    Tdap 06/15/2013     Current Outpatient Prescriptions   Medication Sig Dispense Refill    ezetimibe (ZETIA) 10 mg tablet Take 1 tablet (10 mg total) by mouth daily 90 tablet 1    losartan (COZAAR) 100 MG tablet TAKE 1 TABLET DAILY 90 tablet 1    rosuvastatin (CRESTOR) 20 MG tablet Take 20 mg by mouth         No current facility-administered medications for this visit  Allergies: Ace inhibitors    Objective:  Vitals:    08/15/18 1100   BP: 152/80   Pulse: 78   Weight: (!) 140 kg (308 lb)   Height: 6' (1 829 m)          Wt Readings from Last 3 Encounters:   08/15/18 (!) 140 kg (308 lb)   05/17/18 (!) 137 kg (301 lb 8 oz)   01/15/18 133 kg (292 lb 3 oz)     Body mass index is 41 77 kg/m²  Physical Exam   Constitutional: He is oriented to person, place, and time  He appears well-developed and well-nourished  HENT:   Head: Normocephalic and atraumatic  Crowded oropharyngeal airways, Mallampati score 4   Eyes: EOM are normal  Pupils are equal, round, and reactive to light  Neck: Normal range of motion  Neck supple  Short and wide neck   Cardiovascular: Normal rate, regular rhythm and normal heart sounds  Pulmonary/Chest: Effort normal and breath sounds normal    Musculoskeletal: Normal range of motion  Neurological: He is alert and oriented to person, place, and time  Skin: Skin is warm and dry  Psychiatric: He has a normal mood and affect   His behavior is normal             ESS: Total score: 4

## 2018-11-13 LAB
ALBUMIN SERPL-MCNC: 4.5 G/DL (ref 3.6–5.1)
ALBUMIN/GLOB SERPL: 1.9 (CALC) (ref 1–2.5)
ALP SERPL-CCNC: 37 U/L (ref 40–115)
ALT SERPL-CCNC: 23 U/L (ref 9–46)
AST SERPL-CCNC: 18 U/L (ref 10–40)
BILIRUB SERPL-MCNC: 1.4 MG/DL (ref 0.2–1.2)
BUN SERPL-MCNC: 17 MG/DL (ref 7–25)
BUN/CREAT SERPL: ABNORMAL (CALC) (ref 6–22)
CALCIUM SERPL-MCNC: 9.2 MG/DL (ref 8.6–10.3)
CHLORIDE SERPL-SCNC: 105 MMOL/L (ref 98–110)
CHOLEST SERPL-MCNC: 113 MG/DL
CHOLEST/HDLC SERPL: 3.1 (CALC)
CO2 SERPL-SCNC: 28 MMOL/L (ref 20–32)
CREAT SERPL-MCNC: 0.98 MG/DL (ref 0.6–1.35)
GLOBULIN SER CALC-MCNC: 2.4 G/DL (CALC) (ref 1.9–3.7)
GLUCOSE SERPL-MCNC: 97 MG/DL (ref 65–99)
HDLC SERPL-MCNC: 36 MG/DL
LDLC SERPL CALC-MCNC: 52 MG/DL (CALC)
NONHDLC SERPL-MCNC: 77 MG/DL (CALC)
POTASSIUM SERPL-SCNC: 4.2 MMOL/L (ref 3.5–5.3)
PROT SERPL-MCNC: 6.9 G/DL (ref 6.1–8.1)
SL AMB EGFR AFRICAN AMERICAN: 106 ML/MIN/1.73M2
SL AMB EGFR NON AFRICAN AMERICAN: 91 ML/MIN/1.73M2
SODIUM SERPL-SCNC: 140 MMOL/L (ref 135–146)
TRIGL SERPL-MCNC: 170 MG/DL

## 2018-11-15 ENCOUNTER — OFFICE VISIT (OUTPATIENT)
Dept: FAMILY MEDICINE CLINIC | Facility: CLINIC | Age: 48
End: 2018-11-15
Payer: COMMERCIAL

## 2018-11-15 VITALS
DIASTOLIC BLOOD PRESSURE: 82 MMHG | RESPIRATION RATE: 18 BRPM | HEIGHT: 72 IN | WEIGHT: 309.6 LBS | HEART RATE: 69 BPM | BODY MASS INDEX: 41.93 KG/M2 | SYSTOLIC BLOOD PRESSURE: 134 MMHG | OXYGEN SATURATION: 96 % | TEMPERATURE: 96.4 F

## 2018-11-15 DIAGNOSIS — G47.33 OSA (OBSTRUCTIVE SLEEP APNEA): ICD-10-CM

## 2018-11-15 DIAGNOSIS — I10 BENIGN ESSENTIAL HYPERTENSION: Primary | ICD-10-CM

## 2018-11-15 DIAGNOSIS — E78.5 DYSLIPIDEMIA: ICD-10-CM

## 2018-11-15 PROCEDURE — 1036F TOBACCO NON-USER: CPT | Performed by: FAMILY MEDICINE

## 2018-11-15 PROCEDURE — 3079F DIAST BP 80-89 MM HG: CPT | Performed by: FAMILY MEDICINE

## 2018-11-15 PROCEDURE — 99214 OFFICE O/P EST MOD 30 MIN: CPT | Performed by: FAMILY MEDICINE

## 2018-11-15 PROCEDURE — 3075F SYST BP GE 130 - 139MM HG: CPT | Performed by: FAMILY MEDICINE

## 2018-11-15 PROCEDURE — 3008F BODY MASS INDEX DOCD: CPT | Performed by: FAMILY MEDICINE

## 2018-11-15 NOTE — PROGRESS NOTES
Assessment/Plan:    Benign essential hypertension   Reasonably controlled on losartan 100  I asked patient to check with his pharmacist regarding recent recall with losartan    Dyslipidemia   Cholesterol 113/52  Doing well on Crestor 20 and Zetia 10    SAUL (obstructive sleep apnea)   Doing well on nightly CPAP       Diagnoses and all orders for this visit:    Benign essential hypertension  -     CBC and differential; Future  -     TSH, 3rd generation with Free T4 reflex; Future  -     CBC and differential  -     TSH, 3rd generation with Free T4 reflex    Dyslipidemia  -     Comprehensive metabolic panel  -     Lipid Panel with Direct LDL reflex  -     Comprehensive metabolic panel; Future  -     Lipid Panel with Direct LDL reflex; Future  -     Comprehensive metabolic panel  -     Lipid Panel with Direct LDL reflex  -     Cardio IQ(R) Advanced Lipid Panel; Future  -     Cardio IQ(R) Advanced Lipid Panel    SAUL (obstructive sleep apnea)       Patient had flu shot   6 months, fasting blood work prior at [facility]    Subjective:      Patient ID: Mark Jorge is a 52 y o  male  Patient presents for recheck of chronic medical problems today  Overall is feeling well without complaints  Doing well on losartan for high blood pressure  Doing well on Crestor and Zetia for cholesterol  Doing well on nightly CPAP        The following portions of the patient's history were reviewed and updated as appropriate: allergies, current medications, past family history, past medical history, past social history, past surgical history and problem list     Review of Systems   Respiratory: Negative  Cardiovascular: Negative  Gastrointestinal: Negative  Genitourinary: Negative            Objective:      /82   Pulse 69   Temp (!) 96 4 °F (35 8 °C) (Tympanic)   Resp 18   Ht 6' (1 829 m)   Wt (!) 140 kg (309 lb 9 6 oz)   SpO2 96%   BMI 41 99 kg/m²          Physical Exam   Cardiovascular: Normal rate, regular rhythm, normal heart sounds and intact distal pulses  Carotids: no bruits  Ext: no edema   Pulmonary/Chest: Effort normal  No respiratory distress  He has no wheezes  He has no rales  Psychiatric: He has a normal mood and affect   His behavior is normal  Thought content normal

## 2018-11-15 NOTE — ASSESSMENT & PLAN NOTE
Reasonably controlled on losartan 100    I asked patient to check with his pharmacist regarding recent recall with losartan

## 2018-12-02 DIAGNOSIS — E78.5 HYPERLIPIDEMIA LDL GOAL <100: Primary | ICD-10-CM

## 2018-12-03 RX ORDER — ROSUVASTATIN CALCIUM 20 MG/1
TABLET, COATED ORAL
Qty: 90 TABLET | Refills: 3 | Status: SHIPPED | OUTPATIENT
Start: 2018-12-03 | End: 2019-05-17

## 2018-12-18 DIAGNOSIS — I10 ESSENTIAL HYPERTENSION: ICD-10-CM

## 2018-12-18 RX ORDER — LOSARTAN POTASSIUM 100 MG/1
TABLET ORAL
Qty: 90 TABLET | Refills: 1 | Status: SHIPPED | OUTPATIENT
Start: 2018-12-18 | End: 2019-04-05 | Stop reason: SDUPTHER

## 2019-01-03 DIAGNOSIS — E78.2 MIXED HYPERLIPIDEMIA: ICD-10-CM

## 2019-01-03 RX ORDER — EZETIMIBE 10 MG/1
TABLET ORAL
Qty: 90 TABLET | Refills: 1 | Status: SHIPPED | OUTPATIENT
Start: 2019-01-03 | End: 2019-05-17

## 2019-03-20 ENCOUNTER — DOCUMENTATION (OUTPATIENT)
Dept: FAMILY MEDICINE CLINIC | Facility: CLINIC | Age: 49
End: 2019-03-20

## 2019-03-25 ENCOUNTER — TRANSITIONAL CARE MANAGEMENT (OUTPATIENT)
Dept: FAMILY MEDICINE CLINIC | Facility: CLINIC | Age: 49
End: 2019-03-25

## 2019-03-26 ENCOUNTER — TELEPHONE (OUTPATIENT)
Dept: FAMILY MEDICINE CLINIC | Facility: CLINIC | Age: 49
End: 2019-03-26

## 2019-03-26 NOTE — TELEPHONE ENCOUNTER
Clayton Nava came into the office and had us fax a authorization to disclose information regarding  his disability paper for work  There hospital filled out the original FMLA paper work for him  I did fax the authorization to [facility] fax number [phone] and it came through on our end as it went through confirmation was given to pt's wife Clayton Nava

## 2019-04-05 ENCOUNTER — TELEPHONE (OUTPATIENT)
Dept: FAMILY MEDICINE CLINIC | Facility: CLINIC | Age: 49
End: 2019-04-05

## 2019-04-05 ENCOUNTER — OFFICE VISIT (OUTPATIENT)
Dept: FAMILY MEDICINE CLINIC | Facility: CLINIC | Age: 49
End: 2019-04-05
Payer: COMMERCIAL

## 2019-04-05 VITALS
WEIGHT: 307 LBS | BODY MASS INDEX: 41.58 KG/M2 | DIASTOLIC BLOOD PRESSURE: 72 MMHG | HEIGHT: 72 IN | RESPIRATION RATE: 18 BRPM | OXYGEN SATURATION: 98 % | SYSTOLIC BLOOD PRESSURE: 124 MMHG | TEMPERATURE: 97.8 F | HEART RATE: 51 BPM

## 2019-04-05 DIAGNOSIS — G47.33 OSA (OBSTRUCTIVE SLEEP APNEA): ICD-10-CM

## 2019-04-05 DIAGNOSIS — R74.8 ELEVATED CK: ICD-10-CM

## 2019-04-05 DIAGNOSIS — C71.9 GLIOBLASTOMA (HCC): Primary | ICD-10-CM

## 2019-04-05 DIAGNOSIS — I10 ESSENTIAL HYPERTENSION: ICD-10-CM

## 2019-04-05 DIAGNOSIS — I10 BENIGN ESSENTIAL HYPERTENSION: ICD-10-CM

## 2019-04-05 DIAGNOSIS — R73.9 ELEVATED BLOOD SUGAR: ICD-10-CM

## 2019-04-05 DIAGNOSIS — E78.5 DYSLIPIDEMIA: ICD-10-CM

## 2019-04-05 DIAGNOSIS — K21.9 GASTROESOPHAGEAL REFLUX DISEASE WITHOUT ESOPHAGITIS: ICD-10-CM

## 2019-04-05 LAB — SL AMB POCT HEMOGLOBIN AIC: 5.9 (ref ?–6.5)

## 2019-04-05 PROCEDURE — 3044F HG A1C LEVEL LT 7.0%: CPT | Performed by: FAMILY MEDICINE

## 2019-04-05 PROCEDURE — 99495 TRANSJ CARE MGMT MOD F2F 14D: CPT | Performed by: FAMILY MEDICINE

## 2019-04-05 PROCEDURE — 83036 HEMOGLOBIN GLYCOSYLATED A1C: CPT | Performed by: FAMILY MEDICINE

## 2019-04-05 RX ORDER — FAMOTIDINE 10 MG
10 TABLET ORAL
COMMUNITY
Start: 2019-03-22 | End: 2019-04-05 | Stop reason: SDUPTHER

## 2019-04-05 RX ORDER — LOSARTAN POTASSIUM 100 MG/1
100 TABLET ORAL DAILY
Qty: 90 TABLET | Refills: 1 | Status: SHIPPED | OUTPATIENT
Start: 2019-04-05 | End: 2019-10-21 | Stop reason: SDUPTHER

## 2019-04-05 RX ORDER — METFORMIN HYDROCHLORIDE 500 MG/1
500 TABLET, EXTENDED RELEASE ORAL
Qty: 90 TABLET | Refills: 1 | Status: SHIPPED | OUTPATIENT
Start: 2019-04-05 | End: 2019-07-25

## 2019-04-05 RX ORDER — LEVETIRACETAM 500 MG/1
500 TABLET ORAL EVERY 12 HOURS SCHEDULED
Qty: 180 TABLET | Refills: 1 | Status: SHIPPED | OUTPATIENT
Start: 2019-04-05 | End: 2019-04-08 | Stop reason: SDUPTHER

## 2019-04-05 RX ORDER — AMLODIPINE BESYLATE 10 MG/1
10 TABLET ORAL
COMMUNITY
Start: 2019-03-23 | End: 2019-04-05 | Stop reason: SDUPTHER

## 2019-04-05 RX ORDER — METFORMIN HYDROCHLORIDE 500 MG/1
500 TABLET, EXTENDED RELEASE ORAL
COMMUNITY
Start: 2019-03-22 | End: 2019-04-05 | Stop reason: SDUPTHER

## 2019-04-05 RX ORDER — LEVETIRACETAM 500 MG/1
500 TABLET ORAL
COMMUNITY
Start: 2019-04-01 | End: 2019-04-05 | Stop reason: SDUPTHER

## 2019-04-05 RX ORDER — AMLODIPINE BESYLATE 10 MG/1
10 TABLET ORAL DAILY
Qty: 90 TABLET | Refills: 1 | Status: SHIPPED | OUTPATIENT
Start: 2019-04-05 | End: 2019-10-19 | Stop reason: SDUPTHER

## 2019-04-05 RX ORDER — DEXAMETHASONE 2 MG/1
2 TABLET ORAL 2 TIMES DAILY WITH MEALS
Qty: 180 TABLET | Refills: 1 | Status: SHIPPED | OUTPATIENT
Start: 2019-04-05 | End: 2019-04-08 | Stop reason: SDUPTHER

## 2019-04-05 RX ORDER — FAMOTIDINE 10 MG
10 TABLET ORAL 2 TIMES DAILY
Qty: 180 TABLET | Refills: 1 | Status: SHIPPED | OUTPATIENT
Start: 2019-04-05 | End: 2019-04-08 | Stop reason: SDUPTHER

## 2019-04-05 RX ORDER — ACETAMINOPHEN 500 MG
1000 TABLET ORAL EVERY 6 HOURS PRN
COMMUNITY

## 2019-04-05 RX ORDER — DEXAMETHASONE 2 MG/1
2 TABLET ORAL
COMMUNITY
Start: 2019-04-01 | End: 2019-04-05 | Stop reason: SDUPTHER

## 2019-04-08 ENCOUNTER — TELEPHONE (OUTPATIENT)
Dept: FAMILY MEDICINE CLINIC | Facility: CLINIC | Age: 49
End: 2019-04-08

## 2019-04-08 DIAGNOSIS — K21.9 GASTROESOPHAGEAL REFLUX DISEASE WITHOUT ESOPHAGITIS: ICD-10-CM

## 2019-04-08 DIAGNOSIS — C71.9 GLIOBLASTOMA (HCC): ICD-10-CM

## 2019-04-08 RX ORDER — DEXAMETHASONE 2 MG/1
2 TABLET ORAL 2 TIMES DAILY WITH MEALS
Qty: 60 TABLET | Refills: 0 | Status: SHIPPED | OUTPATIENT
Start: 2019-04-08

## 2019-04-08 RX ORDER — FAMOTIDINE 10 MG
10 TABLET ORAL 2 TIMES DAILY
Qty: 60 TABLET | Refills: 0 | Status: SHIPPED | OUTPATIENT
Start: 2019-04-08 | End: 2020-04-07

## 2019-04-08 RX ORDER — LEVETIRACETAM 500 MG/1
500 TABLET ORAL EVERY 12 HOURS SCHEDULED
Qty: 60 TABLET | Refills: 3 | Status: SHIPPED | OUTPATIENT
Start: 2019-04-08 | End: 2019-10-21 | Stop reason: SDUPTHER

## 2019-04-09 LAB
ALBUMIN SERPL-MCNC: 3.9 G/DL (ref 3.6–5.1)
ALBUMIN/GLOB SERPL: 1.6 (CALC) (ref 1–2.5)
ALP SERPL-CCNC: 27 U/L (ref 40–115)
ALT SERPL-CCNC: 30 U/L (ref 9–46)
AST SERPL-CCNC: 11 U/L (ref 10–40)
BASOPHILS # BLD AUTO: 13 CELLS/UL (ref 0–200)
BASOPHILS NFR BLD AUTO: 0.2 %
BILIRUB SERPL-MCNC: 1 MG/DL (ref 0.2–1.2)
BUN SERPL-MCNC: 29 MG/DL (ref 7–25)
BUN/CREAT SERPL: 30 (CALC) (ref 6–22)
CALCIUM SERPL-MCNC: 9.1 MG/DL (ref 8.6–10.3)
CHLORIDE SERPL-SCNC: 106 MMOL/L (ref 98–110)
CK SERPL-CCNC: 37 U/L (ref 44–196)
CO2 SERPL-SCNC: 27 MMOL/L (ref 20–32)
CREAT SERPL-MCNC: 0.98 MG/DL (ref 0.6–1.35)
EOSINOPHIL # BLD AUTO: 50 CELLS/UL (ref 15–500)
EOSINOPHIL NFR BLD AUTO: 0.8 %
ERYTHROCYTE [DISTWIDTH] IN BLOOD BY AUTOMATED COUNT: 14.3 % (ref 11–15)
GLOBULIN SER CALC-MCNC: 2.4 G/DL (CALC) (ref 1.9–3.7)
GLUCOSE SERPL-MCNC: 74 MG/DL (ref 65–139)
HCT VFR BLD AUTO: 42.4 % (ref 38.5–50)
HGB BLD-MCNC: 14.1 G/DL (ref 13.2–17.1)
LYMPHOCYTES # BLD AUTO: 958 CELLS/UL (ref 850–3900)
LYMPHOCYTES NFR BLD AUTO: 15.2 %
MCH RBC QN AUTO: 30.8 PG (ref 27–33)
MCHC RBC AUTO-ENTMCNC: 33.3 G/DL (ref 32–36)
MCV RBC AUTO: 92.6 FL (ref 80–100)
MONOCYTES # BLD AUTO: 517 CELLS/UL (ref 200–950)
MONOCYTES NFR BLD AUTO: 8.2 %
NEUTROPHILS # BLD AUTO: 4763 CELLS/UL (ref 1500–7800)
NEUTROPHILS NFR BLD AUTO: 75.6 %
PLATELET # BLD AUTO: 135 THOUSAND/UL (ref 140–400)
PMV BLD REES-ECKER: 10.8 FL (ref 7.5–12.5)
POTASSIUM SERPL-SCNC: 4.1 MMOL/L (ref 3.5–5.3)
PROT SERPL-MCNC: 6.3 G/DL (ref 6.1–8.1)
RBC # BLD AUTO: 4.58 MILLION/UL (ref 4.2–5.8)
SL AMB EGFR AFRICAN AMERICAN: 105 ML/MIN/1.73M2
SL AMB EGFR NON AFRICAN AMERICAN: 91 ML/MIN/1.73M2
SODIUM SERPL-SCNC: 142 MMOL/L (ref 135–146)
WBC # BLD AUTO: 6.3 THOUSAND/UL (ref 3.8–10.8)

## 2019-05-17 ENCOUNTER — OFFICE VISIT (OUTPATIENT)
Dept: FAMILY MEDICINE CLINIC | Facility: CLINIC | Age: 49
End: 2019-05-17
Payer: COMMERCIAL

## 2019-05-17 VITALS
HEIGHT: 72 IN | DIASTOLIC BLOOD PRESSURE: 72 MMHG | OXYGEN SATURATION: 91 % | TEMPERATURE: 96.5 F | RESPIRATION RATE: 18 BRPM | SYSTOLIC BLOOD PRESSURE: 126 MMHG | WEIGHT: 315 LBS | BODY MASS INDEX: 42.66 KG/M2 | HEART RATE: 77 BPM

## 2019-05-17 DIAGNOSIS — G47.33 OSA (OBSTRUCTIVE SLEEP APNEA): ICD-10-CM

## 2019-05-17 DIAGNOSIS — C71.9 GLIOBLASTOMA (HCC): Primary | ICD-10-CM

## 2019-05-17 DIAGNOSIS — I10 BENIGN ESSENTIAL HYPERTENSION: ICD-10-CM

## 2019-05-17 DIAGNOSIS — E78.5 DYSLIPIDEMIA: ICD-10-CM

## 2019-05-17 PROCEDURE — 3008F BODY MASS INDEX DOCD: CPT | Performed by: FAMILY MEDICINE

## 2019-05-17 PROCEDURE — 1036F TOBACCO NON-USER: CPT | Performed by: FAMILY MEDICINE

## 2019-05-17 PROCEDURE — 99214 OFFICE O/P EST MOD 30 MIN: CPT | Performed by: FAMILY MEDICINE

## 2019-05-17 PROCEDURE — 3074F SYST BP LT 130 MM HG: CPT | Performed by: FAMILY MEDICINE

## 2019-05-17 PROCEDURE — 3078F DIAST BP <80 MM HG: CPT | Performed by: FAMILY MEDICINE

## 2019-05-28 LAB
ALBUMIN SERPL-MCNC: 4.1 G/DL (ref 3.6–5.1)
ALBUMIN/GLOB SERPL: 2.1 (CALC) (ref 1–2.5)
ALP SERPL-CCNC: 30 U/L (ref 40–115)
ALT SERPL-CCNC: 35 U/L (ref 9–46)
APO B SERPL-MCNC: 118 MG/DL (ref 52–109)
AST SERPL-CCNC: 13 U/L (ref 10–40)
BILIRUB SERPL-MCNC: 1.4 MG/DL (ref 0.2–1.2)
BUN SERPL-MCNC: 12 MG/DL (ref 7–25)
BUN/CREAT SERPL: ABNORMAL (CALC) (ref 6–22)
CALCIUM SERPL-MCNC: 9 MG/DL (ref 8.6–10.3)
CHLORIDE SERPL-SCNC: 104 MMOL/L (ref 98–110)
CHOLEST SERPL-MCNC: 220 MG/DL
CHOLEST SERPL-MCNC: 224 MG/DL
CHOLEST/HDLC SERPL: 6.5 (CALC)
CHOLEST/HDLC SERPL: 6.8 CALC
CO2 SERPL-SCNC: 27 MMOL/L (ref 20–32)
CREAT SERPL-MCNC: 0.95 MG/DL (ref 0.6–1.35)
GLOBULIN SER CALC-MCNC: 2 G/DL (CALC) (ref 1.9–3.7)
GLUCOSE SERPL-MCNC: 76 MG/DL (ref 65–99)
HDLC SERPL-MCNC: 33 MG/DL
HDLC SERPL-MCNC: 34 MG/DL
HLD.LARGE SERPL-SCNC: 4016 NMOL/L (ref 3382–9376)
LDL SERPL QN: 213.9 ANGSTROM
LDL SERPL-SCNC: 1758 NMOL/L (ref 732–2035)
LDL SMALL SERPL-SCNC: 507 NMOL/L (ref 85–473)
LDLC REAL SIZE PAT SERPL: ABNORMAL PATTERN
LDLC SERPL CALC-MCNC: 149 MG/DL (CALC)
LDLC SERPL CALC-MCNC: 152 MG/DL
LPA SERPL-SCNC: 38 NMOL/L
NONHDLC SERPL-MCNC: 186 MG/DL (CALC)
NONHDLC SERPL-MCNC: 191 MG/DL (CALC)
POTASSIUM SERPL-SCNC: 3.8 MMOL/L (ref 3.5–5.3)
PROT SERPL-MCNC: 6.1 G/DL (ref 6.1–8.1)
SL AMB EGFR AFRICAN AMERICAN: 109 ML/MIN/1.73M2
SL AMB EGFR NON AFRICAN AMERICAN: 94 ML/MIN/1.73M2
SL AMB LDL MEDIUM: 442 NMOL/L (ref 122–498)
SODIUM SERPL-SCNC: 139 MMOL/L (ref 135–146)
TRIGL SERPL-MCNC: 220 MG/DL
TRIGL SERPL-MCNC: 228 MG/DL

## 2019-07-02 DIAGNOSIS — E78.2 MIXED HYPERLIPIDEMIA: Primary | ICD-10-CM

## 2019-07-02 RX ORDER — EZETIMIBE 10 MG/1
TABLET ORAL
Qty: 90 TABLET | Refills: 1 | Status: SHIPPED | OUTPATIENT
Start: 2019-07-02 | End: 2019-08-21

## 2019-07-25 ENCOUNTER — OFFICE VISIT (OUTPATIENT)
Dept: FAMILY MEDICINE CLINIC | Facility: CLINIC | Age: 49
End: 2019-07-25
Payer: COMMERCIAL

## 2019-07-25 VITALS
SYSTOLIC BLOOD PRESSURE: 118 MMHG | HEIGHT: 73 IN | WEIGHT: 314.4 LBS | OXYGEN SATURATION: 90 % | RESPIRATION RATE: 17 BRPM | BODY MASS INDEX: 41.67 KG/M2 | DIASTOLIC BLOOD PRESSURE: 70 MMHG | TEMPERATURE: 98.5 F | HEART RATE: 67 BPM

## 2019-07-25 DIAGNOSIS — G47.33 OSA (OBSTRUCTIVE SLEEP APNEA): ICD-10-CM

## 2019-07-25 DIAGNOSIS — I10 BENIGN ESSENTIAL HYPERTENSION: ICD-10-CM

## 2019-07-25 DIAGNOSIS — R73.9 ELEVATED BLOOD SUGAR: ICD-10-CM

## 2019-07-25 DIAGNOSIS — C71.9 GLIOBLASTOMA (HCC): Primary | ICD-10-CM

## 2019-07-25 DIAGNOSIS — E78.5 DYSLIPIDEMIA: ICD-10-CM

## 2019-07-25 PROCEDURE — 3078F DIAST BP <80 MM HG: CPT | Performed by: FAMILY MEDICINE

## 2019-07-25 PROCEDURE — 1036F TOBACCO NON-USER: CPT | Performed by: FAMILY MEDICINE

## 2019-07-25 PROCEDURE — 3074F SYST BP LT 130 MM HG: CPT | Performed by: FAMILY MEDICINE

## 2019-07-25 PROCEDURE — 99214 OFFICE O/P EST MOD 30 MIN: CPT | Performed by: FAMILY MEDICINE

## 2019-07-25 PROCEDURE — 3008F BODY MASS INDEX DOCD: CPT | Performed by: FAMILY MEDICINE

## 2019-07-25 RX ORDER — TEMOZOLOMIDE 20 MG/1
CAPSULE ORAL
COMMUNITY
Start: 2019-07-11

## 2019-07-25 RX ORDER — DAPSONE 100 MG/1
100 TABLET ORAL DAILY
COMMUNITY
Start: 2019-07-17 | End: 2019-10-21 | Stop reason: SDUPTHER

## 2019-07-25 RX ORDER — TEMOZOLOMIDE 180 MG/1
CAPSULE ORAL
COMMUNITY
Start: 2019-07-11

## 2019-07-25 RX ORDER — TEMOZOLOMIDE 100 MG/1
CAPSULE ORAL
COMMUNITY
Start: 2019-05-28

## 2019-07-25 RX ORDER — ROSUVASTATIN CALCIUM 20 MG/1
20 TABLET, COATED ORAL DAILY
COMMUNITY
Start: 2019-06-04 | End: 2019-08-21 | Stop reason: SDUPTHER

## 2019-07-25 RX ORDER — TEMOZOLOMIDE 140 MG/1
CAPSULE ORAL
COMMUNITY
Start: 2019-07-11

## 2019-07-25 RX ORDER — ONDANSETRON HYDROCHLORIDE 8 MG/1
8 TABLET, FILM COATED ORAL EVERY 8 HOURS PRN
COMMUNITY
Start: 2019-07-17

## 2019-07-25 NOTE — ASSESSMENT & PLAN NOTE
Patient with recent elevation in bilirubin and transaminases  This is most likely due to Temodar  Rosuvastatin 20 and Zetia 10 have been discontinued for now  Last LFTs on July 19th show bilirubin of 1 9  Patient does not exhibit any clinical jaundice on today's exam   Will recheck LFTs in 1-2 weeks  If stable/normalized, will attempt to restart rosuvastatin at 5 mg daily

## 2019-07-25 NOTE — PROGRESS NOTES
[facility]      NAME: Gerard Weinstein  AGE: 50 y o  SEX: male  : 1970   MRN: [id number]    DATE: 2019  TIME: 10:33 AM    Assessment and Plan     Problem List Items Addressed This Visit     Benign essential hypertension     Under excellent control on losartan 100, amlodipine 5, metoprolol 25 b i d  Dyslipidemia     Patient with recent elevation in bilirubin and transaminases  This is most likely due to Temodar  Rosuvastatin 20 and Zetia 10 have been discontinued for now  Last LFTs on  show bilirubin of 1 9  Patient does not exhibit any clinical jaundice on today's exam   Will recheck LFTs in 1-2 weeks  If stable/normalized, will attempt to restart rosuvastatin at 5 mg daily  Relevant Orders    Comprehensive metabolic panel    Lipid Panel with Direct LDL reflex    SAUL (obstructive sleep apnea)     Still using CPAP nightly         Glioblastoma ([facility] [address] ) - Primary     History of glioblastoma  Diagnosed on 3/13/2019  Patient had surgical resection on   Radiation and chemotherapy  to   He is now on Temodar since   He has been exhibiting some nausea, which seems controlled on Zofran  He also is exhibiting some elevated bilirubin, which is a known side effect of medication  Will continue to monitor  Continue follow-up with Oncology team at [facility] (including Dr Razia Gifford)         Relevant Medications    temozolomide (TEMODAR) 20 mg capsule    temozolomide (TEMODAR) 180 mg capsule    temozolomide (TEMODAR) 140 mg capsule    temozolomide (TEMODAR) 100 mg capsule    Elevated blood sugar     No longer and metformin  Will check CMP and A1c in a few weeks  Will call with results         Relevant Orders    Hemoglobin A1c (w/out EAG)              Return to office in:  Recheck CMP, A1c, lipids in 1-2 weeks    At [facility]   Will call with results    Recheck 3 months    Chief Complaint     Chief Complaint   Patient presents with    Follow-up     2M       History of Present Illness     HPI    The following portions of the patient's history were reviewed and updated as appropriate: allergies, current medications, past family history, past medical history, past social history, past surgical history and problem list     Review of Systems   Review of Systems   Respiratory: Negative  Cardiovascular: Negative  Gastrointestinal: Positive for nausea  Genitourinary: Negative  Active Problem List     Patient Active Problem List   Diagnosis    Benign essential hypertension    Dyslipidemia    SAUL (obstructive sleep apnea)    Elevated bilirubin    Glioblastoma (HCC)    Elevated blood sugar       Objective   /70 (BP Location: Left arm, Patient Position: Sitting, Cuff Size: Large)   Pulse 67   Temp 98 5 °F (36 9 °C) (Tympanic)   Resp 17   Ht 6' 1 23" (1 86 m)   Wt (!) 143 kg (314 lb 6 4 oz)   SpO2 90%   BMI 41 22 kg/m²     Physical Exam   Cardiovascular: Normal rate, regular rhythm, normal heart sounds and intact distal pulses  Carotids: no bruits  Ext: no edema   Pulmonary/Chest: Effort normal  No respiratory distress  He has no wheezes  He has no rales  Psychiatric: He has a normal mood and affect   His behavior is normal  Thought content normal        Pertinent Laboratory/Diagnostic Studies:  Labs    Current Medications     Current Outpatient Medications:     acetaminophen (TYLENOL) 500 mg tablet, Take 1,000 mg by mouth every 6 (six) hours as needed, Disp: , Rfl:     amLODIPine (NORVASC) 10 mg tablet, Take 1 tablet (10 mg total) by mouth daily, Disp: 90 tablet, Rfl: 1    dapsone 100 mg tablet, Take 100 mg by mouth daily, Disp: , Rfl:     levETIRAcetam (KEPPRA) 500 mg tablet, Take 1 tablet (500 mg total) by mouth every 12 (twelve) hours (Patient taking differently: Take 750 mg by mouth every 12 (twelve) hours ), Disp: 60 tablet, Rfl: 3    losartan (COZAAR) 100 MG tablet, Take 1 tablet (100 mg total) by mouth daily, Disp: 90 tablet, Rfl: 1    metoprolol tartrate (LOPRESSOR) 25 mg tablet, Take 1 tablet (25 mg total) by mouth every 12 (twelve) hours, Disp: 180 tablet, Rfl: 1    ondansetron (ZOFRAN) 8 mg tablet, Take 8 mg by mouth every 8 (eight) hours as needed, Disp: , Rfl:     temozolomide (TEMODAR) 100 mg capsule, , Disp: , Rfl:     temozolomide (TEMODAR) 140 mg capsule, , Disp: , Rfl:     temozolomide (TEMODAR) 180 mg capsule, , Disp: , Rfl:     temozolomide (TEMODAR) 20 mg capsule, , Disp: , Rfl:     dexamethasone (DECADRON) 2 mg tablet, Take 1 tablet (2 mg total) by mouth 2 (two) times a day with meals (Patient not taking: Reported on 7/25/2019), Disp: 60 tablet, Rfl: 0    ezetimibe (ZETIA) 10 mg tablet, TAKE 1 TABLET DAILY (Patient not taking: Reported on 7/25/2019), Disp: 90 tablet, Rfl: 1    famotidine (PEPCID) 10 mg tablet, Take 1 tablet (10 mg total) by mouth 2 (two) times a day (Patient not taking: Reported on 7/25/2019), Disp: 60 tablet, Rfl: 0    rosuvastatin (CRESTOR) 20 MG tablet, Take 20 mg by mouth daily, Disp: , Rfl:     Health Maintenance     Health Maintenance   Topic Date Due    Pneumococcal Vaccine: Pediatrics (0 to 5 Years) and At-Risk Patients (6 to 59 Years) (1 of 3 - PCV13) 11/20/1976    BMI: Followup Plan  11/20/1988    INFLUENZA VACCINE  10/24/2019 (Originally 7/1/2019)    Depression Screening PHQ  07/25/2020 (Originally 5/17/2019)    BMI: Adult  05/17/2020    DTaP,Tdap,and Td Vaccines (2 - Td) 06/15/2023    Pneumococcal Vaccine: 65+ Years (1 of 2 - PCV13) 11/20/2035    HEPATITIS B VACCINES  Aged Out     Immunization History   Administered Date(s) Administered    H1N1, All Formulations 11/21/2009    INFLUENZA 10/14/2015, 10/17/2016, 10/01/2017, 10/24/2018    Influenza Quadrivalent, 6-35 Months IM 10/14/2015, 10/17/2016, 10/01/2017    Influenza TIV (IM) 10/27/2008, 10/11/2010, 10/13/2011    Tdap 06/15/2013       [facility]

## 2019-07-25 NOTE — ASSESSMENT & PLAN NOTE
History of glioblastoma  Diagnosed on 3/13/2019  Patient had surgical resection on March 19  Radiation and chemotherapy April 23rd to June 3rd  He is now on Temodar since July 13th  He has been exhibiting some nausea, which seems controlled on Zofran  He also is exhibiting some elevated bilirubin, which is a known side effect of medication  Will continue to monitor    Continue follow-up with Oncology team at [facility] (including Dr Monica Sheikh)

## 2019-08-13 LAB — HBA1C MFR BLD HPLC: <4 %

## 2019-08-21 DIAGNOSIS — E78.5 DYSLIPIDEMIA: Primary | ICD-10-CM

## 2019-08-21 RX ORDER — ROSUVASTATIN CALCIUM 5 MG/1
5 TABLET, COATED ORAL DAILY
Qty: 30 TABLET | Refills: 5 | Status: SHIPPED | OUTPATIENT
Start: 2019-08-21 | End: 2019-10-21 | Stop reason: SDUPTHER

## 2019-08-21 NOTE — PROGRESS NOTES
I spoke with patient regarding labs from August 13th at [facility]   Bilirubin now 1 6 (was 1 9)  Cholesterol 206/141, HDL 29     Bilirubin elevation most likely due to chemotherapy  Will restart rosuvastatin (former only on 20 mg along with Zetia 10) at 5 mg daily  Recheck hepatic function and 2-4 weeks (quest order placed)    Will call with results

## 2019-10-19 DIAGNOSIS — I10 BENIGN ESSENTIAL HYPERTENSION: ICD-10-CM

## 2019-10-19 RX ORDER — AMLODIPINE BESYLATE 10 MG/1
TABLET ORAL
Qty: 30 TABLET | Refills: 0 | Status: SHIPPED | OUTPATIENT
Start: 2019-10-19 | End: 2019-11-13 | Stop reason: SDUPTHER

## 2019-10-21 DIAGNOSIS — I10 BENIGN ESSENTIAL HYPERTENSION: ICD-10-CM

## 2019-10-21 DIAGNOSIS — I10 ESSENTIAL HYPERTENSION: ICD-10-CM

## 2019-10-21 DIAGNOSIS — E78.5 DYSLIPIDEMIA: ICD-10-CM

## 2019-10-21 DIAGNOSIS — C71.9 GLIOBLASTOMA (HCC): ICD-10-CM

## 2019-10-21 RX ORDER — DAPSONE 100 MG/1
100 TABLET ORAL DAILY
Qty: 30 TABLET | Refills: 0 | Status: SHIPPED | OUTPATIENT
Start: 2019-10-21 | End: 2019-11-20

## 2019-10-21 RX ORDER — LEVETIRACETAM 500 MG/1
750 TABLET ORAL EVERY 12 HOURS SCHEDULED
Qty: 90 TABLET | Refills: 1 | Status: SHIPPED | OUTPATIENT
Start: 2019-10-21 | End: 2019-12-02 | Stop reason: SDUPTHER

## 2019-10-21 RX ORDER — LOSARTAN POTASSIUM 100 MG/1
100 TABLET ORAL DAILY
Qty: 30 TABLET | Refills: 1 | Status: SHIPPED | OUTPATIENT
Start: 2019-10-21

## 2019-10-21 RX ORDER — ROSUVASTATIN CALCIUM 5 MG/1
5 TABLET, COATED ORAL DAILY
Qty: 30 TABLET | Refills: 1 | Status: SHIPPED | OUTPATIENT
Start: 2019-10-21 | End: 2020-10-05

## 2019-10-21 NOTE — TELEPHONE ENCOUNTER
Phone call from wife Sandra Jewell requesting short term refills to be sent to [facility] to hold them over until mail order meds arrive    Last seen 7/25/19  Next OV 10/30/19

## 2019-11-13 DIAGNOSIS — I10 BENIGN ESSENTIAL HYPERTENSION: ICD-10-CM

## 2019-11-13 RX ORDER — AMLODIPINE BESYLATE 10 MG/1
TABLET ORAL
Qty: 30 TABLET | Refills: 0 | Status: SHIPPED | OUTPATIENT
Start: 2019-11-13 | End: 2019-12-02 | Stop reason: SDUPTHER

## 2019-11-20 ENCOUNTER — CLINICAL SUPPORT (OUTPATIENT)
Dept: FAMILY MEDICINE CLINIC | Facility: CLINIC | Age: 49
End: 2019-11-20
Payer: COMMERCIAL

## 2019-11-20 DIAGNOSIS — D84.9 IMMUNOCOMPROMISED STATE (HCC): ICD-10-CM

## 2019-11-20 DIAGNOSIS — Z23 NEED FOR VACCINATION AGAINST STREPTOCOCCUS PNEUMONIAE USING PNEUMOCOCCAL CONJUGATE VACCINE 13: Primary | ICD-10-CM

## 2019-11-20 PROCEDURE — 90471 IMMUNIZATION ADMIN: CPT | Performed by: FAMILY MEDICINE

## 2019-11-20 PROCEDURE — 90670 PCV13 VACCINE IM: CPT | Performed by: FAMILY MEDICINE

## 2019-11-20 NOTE — PROGRESS NOTES
Pt was here today for Prevnar 13 vaccine, Pt states he is getting this vaccine due to starting chemotherapy  Discussed with Dr Maritza Ga ok to give this vaccine

## 2019-11-30 DIAGNOSIS — E78.5 HYPERLIPIDEMIA LDL GOAL <70: Primary | ICD-10-CM

## 2019-12-01 RX ORDER — ROSUVASTATIN CALCIUM 20 MG/1
TABLET, COATED ORAL
Qty: 90 TABLET | Refills: 4 | Status: SHIPPED | OUTPATIENT
Start: 2019-12-01

## 2019-12-02 DIAGNOSIS — C71.9 GLIOBLASTOMA (HCC): ICD-10-CM

## 2019-12-02 DIAGNOSIS — I10 BENIGN ESSENTIAL HYPERTENSION: ICD-10-CM

## 2019-12-02 RX ORDER — LEVETIRACETAM 500 MG/1
750 TABLET ORAL EVERY 12 HOURS SCHEDULED
Qty: 90 TABLET | Refills: 1 | Status: SHIPPED | OUTPATIENT
Start: 2019-12-02 | End: 2019-12-18 | Stop reason: SDUPTHER

## 2019-12-02 RX ORDER — AMLODIPINE BESYLATE 10 MG/1
10 TABLET ORAL DAILY
Qty: 90 TABLET | Refills: 1 | Status: SHIPPED | OUTPATIENT
Start: 2019-12-02

## 2019-12-05 ENCOUNTER — TELEPHONE (OUTPATIENT)
Dept: FAMILY MEDICINE CLINIC | Facility: CLINIC | Age: 49
End: 2019-12-05

## 2019-12-06 NOTE — TELEPHONE ENCOUNTER
Fax came thru [employer] needs clarification     Levetiracetam 500 mg tab  Take 1 1/2 tab every 12 hrs  #135  [facility]     We recently sent 90 day supply 12/3/19

## 2019-12-18 DIAGNOSIS — C71.9 GLIOBLASTOMA (HCC): ICD-10-CM

## 2019-12-18 RX ORDER — LEVETIRACETAM 500 MG/1
750 TABLET ORAL EVERY 12 HOURS SCHEDULED
Qty: 270 TABLET | Refills: 0 | Status: SHIPPED | OUTPATIENT
Start: 2019-12-18 | End: 2020-12-17

## 2019-12-29 DIAGNOSIS — E78.5 DYSLIPIDEMIA: Primary | ICD-10-CM

## 2019-12-30 RX ORDER — EZETIMIBE 10 MG/1
TABLET ORAL
Qty: 90 TABLET | Refills: 4 | Status: SHIPPED | OUTPATIENT
Start: 2019-12-30

## 2020-10-01 DIAGNOSIS — E78.5 DYSLIPIDEMIA: ICD-10-CM

## 2020-10-05 RX ORDER — ROSUVASTATIN CALCIUM 5 MG/1
TABLET, COATED ORAL
Qty: 90 TABLET | Refills: 1 | Status: SHIPPED | OUTPATIENT
Start: 2020-10-05

## 2020-10-15 ENCOUNTER — TELEPHONE (OUTPATIENT)
Dept: FAMILY MEDICINE CLINIC | Facility: CLINIC | Age: 50
End: 2020-10-15